# Patient Record
Sex: FEMALE
[De-identification: names, ages, dates, MRNs, and addresses within clinical notes are randomized per-mention and may not be internally consistent; named-entity substitution may affect disease eponyms.]

---

## 2023-06-05 ENCOUNTER — NURSE TRIAGE (OUTPATIENT)
Dept: OTHER | Facility: CLINIC | Age: 33
End: 2023-06-05

## 2023-06-05 NOTE — TELEPHONE ENCOUNTER
Location of patient:[place]     Received call from [address] [place] [address] at [facility] with Red Flag Complaint. 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 Associated Symptoms: NA    Pain Severity: 3/10 vaginal and abd    Temperature: Denies     What has been tried:     Recommended disposition:  Walk in clinic/UCC     Care advice provided, patient verbalizes understanding; denies any other questions or concerns.     Outcome: See in office today     No Appointments available, patient referred to [address]       This triage is a result of a call to the [facility]    Reason for Disposition   Bad smelling vaginal discharge   Urinating more frequently than usual (i.e., frequency)    Protocols used: Vaginal Discharge-ADULT-[place], Urinary Symptoms-ADULT-[place]